# Patient Record
Sex: FEMALE | Race: BLACK OR AFRICAN AMERICAN | NOT HISPANIC OR LATINO | Employment: FULL TIME | RURAL
[De-identification: names, ages, dates, MRNs, and addresses within clinical notes are randomized per-mention and may not be internally consistent; named-entity substitution may affect disease eponyms.]

---

## 2020-08-01 ENCOUNTER — HISTORICAL (OUTPATIENT)
Dept: ADMINISTRATIVE | Facility: HOSPITAL | Age: 45
End: 2020-08-01

## 2020-08-01 LAB — SARS-COV+SARS-COV-2 AG RESP QL IA.RAPID: NEGATIVE

## 2022-02-22 ENCOUNTER — OFFICE VISIT (OUTPATIENT)
Dept: FAMILY MEDICINE | Facility: CLINIC | Age: 47
End: 2022-02-22
Payer: COMMERCIAL

## 2022-02-22 VITALS
TEMPERATURE: 99 F | SYSTOLIC BLOOD PRESSURE: 159 MMHG | OXYGEN SATURATION: 99 % | BODY MASS INDEX: 47.8 KG/M2 | WEIGHT: 280 LBS | DIASTOLIC BLOOD PRESSURE: 97 MMHG | HEART RATE: 102 BPM | HEIGHT: 64 IN

## 2022-02-22 DIAGNOSIS — R10.9 ABDOMINAL PAIN, UNSPECIFIED ABDOMINAL LOCATION: Primary | ICD-10-CM

## 2022-02-22 DIAGNOSIS — K59.00 CONSTIPATION, UNSPECIFIED CONSTIPATION TYPE: ICD-10-CM

## 2022-02-22 LAB
ALBUMIN SERPL BCP-MCNC: 3.3 G/DL (ref 3.5–5)
ALBUMIN/GLOB SERPL: 0.8 {RATIO}
ALP SERPL-CCNC: 93 U/L (ref 39–100)
ALT SERPL W P-5'-P-CCNC: 27 U/L (ref 13–56)
ANION GAP SERPL CALCULATED.3IONS-SCNC: 8 MMOL/L (ref 7–16)
AST SERPL W P-5'-P-CCNC: 22 U/L (ref 15–37)
BASOPHILS # BLD AUTO: 0.06 K/UL (ref 0–0.2)
BASOPHILS NFR BLD AUTO: 0.9 % (ref 0–1)
BILIRUB SERPL-MCNC: 0.4 MG/DL (ref 0–1.2)
BILIRUB SERPL-MCNC: NEGATIVE MG/DL
BLOOD, POC UA: ABNORMAL
BUN SERPL-MCNC: 7 MG/DL (ref 7–18)
BUN/CREAT SERPL: 10 (ref 6–20)
CALCIUM SERPL-MCNC: 8.6 MG/DL (ref 8.5–10.1)
CHLORIDE SERPL-SCNC: 105 MMOL/L (ref 98–107)
CO2 SERPL-SCNC: 27 MMOL/L (ref 21–32)
CREAT SERPL-MCNC: 0.68 MG/DL (ref 0.55–1.02)
DIFFERENTIAL METHOD BLD: ABNORMAL
EOSINOPHIL # BLD AUTO: 0.2 K/UL (ref 0–0.5)
EOSINOPHIL NFR BLD AUTO: 3.2 % (ref 1–4)
ERYTHROCYTE [DISTWIDTH] IN BLOOD BY AUTOMATED COUNT: 17.1 % (ref 11.5–14.5)
GLOBULIN SER-MCNC: 4.3 G/DL (ref 2–4)
GLUCOSE SERPL-MCNC: 105 MG/DL (ref 74–106)
GLUCOSE UR QL STRIP: NEGATIVE
HCT VFR BLD AUTO: 35 % (ref 38–47)
HGB BLD-MCNC: 11.5 G/DL (ref 12–16)
IMM GRANULOCYTES # BLD AUTO: 0.01 K/UL (ref 0–0.04)
IMM GRANULOCYTES NFR BLD: 0.2 % (ref 0–0.4)
KETONES UR QL STRIP: NEGATIVE
LEUKOCYTE ESTERASE URINE, POC: NEGATIVE
LYMPHOCYTES # BLD AUTO: 2.02 K/UL (ref 1–4.8)
LYMPHOCYTES NFR BLD AUTO: 32 % (ref 27–41)
MCH RBC QN AUTO: 28 PG (ref 27–31)
MCHC RBC AUTO-ENTMCNC: 32.9 G/DL (ref 32–36)
MCV RBC AUTO: 85.2 FL (ref 80–96)
MONOCYTES # BLD AUTO: 0.43 K/UL (ref 0–0.8)
MONOCYTES NFR BLD AUTO: 6.8 % (ref 2–6)
MPC BLD CALC-MCNC: 10.1 FL (ref 9.4–12.4)
NEUTROPHILS # BLD AUTO: 3.6 K/UL (ref 1.8–7.7)
NEUTROPHILS NFR BLD AUTO: 56.9 % (ref 53–65)
NITRITE, POC UA: NEGATIVE
NRBC # BLD AUTO: 0 X10E3/UL
NRBC, AUTO (.00): 0 %
PH, POC UA: 6.5
PLATELET # BLD AUTO: 334 K/UL (ref 150–400)
POTASSIUM SERPL-SCNC: 3.4 MMOL/L (ref 3.5–5.1)
PROT SERPL-MCNC: 7.6 G/DL (ref 6.4–8.2)
PROTEIN, POC: NEGATIVE
RBC # BLD AUTO: 4.11 M/UL (ref 4.2–5.4)
SODIUM SERPL-SCNC: 137 MMOL/L (ref 136–145)
SPECIFIC GRAVITY, POC UA: 1.02
UROBILINOGEN, POC UA: 0.2
WBC # BLD AUTO: 6.32 K/UL (ref 4.5–11)

## 2022-02-22 PROCEDURE — 85025 CBC WITH DIFFERENTIAL: ICD-10-PCS | Mod: ,,, | Performed by: CLINICAL MEDICAL LABORATORY

## 2022-02-22 PROCEDURE — 3080F DIAST BP >= 90 MM HG: CPT | Mod: ,,, | Performed by: NURSE PRACTITIONER

## 2022-02-22 PROCEDURE — 3077F SYST BP >= 140 MM HG: CPT | Mod: ,,, | Performed by: NURSE PRACTITIONER

## 2022-02-22 PROCEDURE — 99204 PR OFFICE/OUTPT VISIT, NEW, LEVL IV, 45-59 MIN: ICD-10-PCS | Mod: ,,, | Performed by: NURSE PRACTITIONER

## 2022-02-22 PROCEDURE — 85025 COMPLETE CBC W/AUTO DIFF WBC: CPT | Mod: ,,, | Performed by: CLINICAL MEDICAL LABORATORY

## 2022-02-22 PROCEDURE — 81003 POCT URINALYSIS: ICD-10-PCS | Mod: QW,,, | Performed by: NURSE PRACTITIONER

## 2022-02-22 PROCEDURE — 3080F PR MOST RECENT DIASTOLIC BLOOD PRESSURE >= 90 MM HG: ICD-10-PCS | Mod: ,,, | Performed by: NURSE PRACTITIONER

## 2022-02-22 PROCEDURE — 99204 OFFICE O/P NEW MOD 45 MIN: CPT | Mod: ,,, | Performed by: NURSE PRACTITIONER

## 2022-02-22 PROCEDURE — 1159F MED LIST DOCD IN RCRD: CPT | Mod: ,,, | Performed by: NURSE PRACTITIONER

## 2022-02-22 PROCEDURE — 1160F PR REVIEW ALL MEDS BY PRESCRIBER/CLIN PHARMACIST DOCUMENTED: ICD-10-PCS | Mod: ,,, | Performed by: NURSE PRACTITIONER

## 2022-02-22 PROCEDURE — 1160F RVW MEDS BY RX/DR IN RCRD: CPT | Mod: ,,, | Performed by: NURSE PRACTITIONER

## 2022-02-22 PROCEDURE — 3077F PR MOST RECENT SYSTOLIC BLOOD PRESSURE >= 140 MM HG: ICD-10-PCS | Mod: ,,, | Performed by: NURSE PRACTITIONER

## 2022-02-22 PROCEDURE — 3008F PR BODY MASS INDEX (BMI) DOCUMENTED: ICD-10-PCS | Mod: ,,, | Performed by: NURSE PRACTITIONER

## 2022-02-22 PROCEDURE — 81003 URINALYSIS AUTO W/O SCOPE: CPT | Mod: QW,,, | Performed by: NURSE PRACTITIONER

## 2022-02-22 PROCEDURE — 3008F BODY MASS INDEX DOCD: CPT | Mod: ,,, | Performed by: NURSE PRACTITIONER

## 2022-02-22 PROCEDURE — 80053 COMPREHEN METABOLIC PANEL: CPT | Mod: ,,, | Performed by: CLINICAL MEDICAL LABORATORY

## 2022-02-22 PROCEDURE — 1159F PR MEDICATION LIST DOCUMENTED IN MEDICAL RECORD: ICD-10-PCS | Mod: ,,, | Performed by: NURSE PRACTITIONER

## 2022-02-22 PROCEDURE — 80053 COMPREHENSIVE METABOLIC PANEL: ICD-10-PCS | Mod: ,,, | Performed by: CLINICAL MEDICAL LABORATORY

## 2022-02-22 RX ORDER — POLYETHYLENE GLYCOL 3350, SODIUM SULFATE ANHYDROUS, SODIUM BICARBONATE, SODIUM CHLORIDE, POTASSIUM CHLORIDE 236; 22.74; 6.74; 5.86; 2.97 G/4L; G/4L; G/4L; G/4L; G/4L
POWDER, FOR SOLUTION ORAL
Qty: 4000 ML | Refills: 0 | Status: SHIPPED | OUTPATIENT
Start: 2022-02-22 | End: 2022-03-23

## 2022-02-22 NOTE — PROGRESS NOTES
"Subjective:       Patient ID: Crys Lenz is a 46 y.o. female.    Chief Complaint: Abdominal Pain (Top & bottom abd. Pain, right side pain x 4 days )    Presents to clinic as above. Had a fever one day last week. None in several days. No dysuria, hematuria, or flank pain. Took a laxative yesterday and that helped some. Does not have a gallbladder. LMP 1/27/22 and normal. No hx of uterine fibroids. No blood in stool. No hx of diabetes.     Review of Systems   Constitutional: Negative for chills and malaise/fatigue.   HENT: Negative.    Respiratory: Negative.    Cardiovascular: Negative.    Gastrointestinal: Positive for abdominal pain and constipation. Negative for blood in stool, diarrhea, melena, nausea and vomiting.   Genitourinary: Negative.    Neurological: Negative.           Reviewed family, medical, surgical, and social history.    Objective:      BP (!) 159/97   Pulse 102   Temp 98.5 °F (36.9 °C)   Ht 5' 4" (1.626 m)   Wt 127 kg (280 lb)   SpO2 99%   BMI 48.06 kg/m²   Physical Exam  Vitals and nursing note reviewed.   Constitutional:       General: She is not in acute distress.     Appearance: She is obese. She is not ill-appearing, toxic-appearing or diaphoretic.   HENT:      Head: Normocephalic.      Right Ear: Tympanic membrane, ear canal and external ear normal.      Left Ear: Tympanic membrane, ear canal and external ear normal.      Mouth/Throat:      Mouth: Mucous membranes are moist.   Cardiovascular:      Rate and Rhythm: Normal rate and regular rhythm.      Pulses: Normal pulses.      Heart sounds: Normal heart sounds.   Pulmonary:      Effort: Pulmonary effort is normal.      Breath sounds: Normal breath sounds.   Abdominal:      General: There is no distension.      Palpations: Abdomen is soft. There is no mass.      Tenderness: There is abdominal tenderness. There is no right CVA tenderness, left CVA tenderness, guarding or rebound.      Hernia: No hernia is present.      Comments: " Generalized tenderness. Bowel sounds hypoactive.    Musculoskeletal:      Cervical back: Normal range of motion and neck supple.   Skin:     General: Skin is warm and dry.      Capillary Refill: Capillary refill takes less than 2 seconds.   Neurological:      General: No focal deficit present.      Mental Status: She is alert and oriented to person, place, and time.   Psychiatric:         Mood and Affect: Mood normal.         Behavior: Behavior normal.         Thought Content: Thought content normal.         Judgment: Judgment normal.            Office Visit on 02/22/2022   Component Date Value Ref Range Status    WBC, UA 02/22/2022 Negative   Final    Nitrite, UA 02/22/2022 Negative   Final    Urobilinogen, UA 02/22/2022 0.2   Final    Protein, POC 02/22/2022 Negative   Final    pH, UA 02/22/2022 6.5   Final    Blood, UA 02/22/2022 Trace   Final    Spec Grav UA 02/22/2022 1.025   Final    Ketones, UA 02/22/2022 Negative   Final    Bilirubin, POC 02/22/2022 Negative   Final    Glucose, UA 02/22/2022 Negative   Final    Sodium 02/22/2022 137  136 - 145 mmol/L Final    Potassium 02/22/2022 3.4 (A) 3.5 - 5.1 mmol/L Final    Chloride 02/22/2022 105  98 - 107 mmol/L Final    CO2 02/22/2022 27  21 - 32 mmol/L Final    Anion Gap 02/22/2022 8  7 - 16 mmol/L Final    Glucose 02/22/2022 105  74 - 106 mg/dL Final    BUN 02/22/2022 7  7 - 18 mg/dL Final    Creatinine 02/22/2022 0.68  0.55 - 1.02 mg/dL Final    BUN/Creatinine Ratio 02/22/2022 10  6 - 20 Final    Calcium 02/22/2022 8.6  8.5 - 10.1 mg/dL Final    Total Protein 02/22/2022 7.6  6.4 - 8.2 g/dL Final    Albumin 02/22/2022 3.3 (A) 3.5 - 5.0 g/dL Final    Globulin 02/22/2022 4.3 (A) 2.0 - 4.0 g/dL Final    A/G Ratio 02/22/2022 0.8   Final    Bilirubin, Total 02/22/2022 0.4  0.0 - 1.2 mg/dL Final    Alk Phos 02/22/2022 93  39 - 100 U/L Final    ALT 02/22/2022 27  13 - 56 U/L Final    AST 02/22/2022 22  15 - 37 U/L Final    eGFR 02/22/2022 99   >=60 mL/min/1.73m² Final    WBC 02/22/2022 6.32  4.50 - 11.00 K/uL Final    RBC 02/22/2022 4.11 (A) 4.20 - 5.40 M/uL Final    Hemoglobin 02/22/2022 11.5 (A) 12.0 - 16.0 g/dL Final    Hematocrit 02/22/2022 35.0 (A) 38.0 - 47.0 % Final    MCV 02/22/2022 85.2  80.0 - 96.0 fL Final    MCH 02/22/2022 28.0  27.0 - 31.0 pg Final    MCHC 02/22/2022 32.9  32.0 - 36.0 g/dL Final    RDW 02/22/2022 17.1 (A) 11.5 - 14.5 % Final    Platelet Count 02/22/2022 334  150 - 400 K/uL Final    MPV 02/22/2022 10.1  9.4 - 12.4 fL Final    Neutrophils % 02/22/2022 56.9  53.0 - 65.0 % Final    Lymphocytes % 02/22/2022 32.0  27.0 - 41.0 % Final    Monocytes % 02/22/2022 6.8 (A) 2.0 - 6.0 % Final    Eosinophils % 02/22/2022 3.2  1.0 - 4.0 % Final    Basophils % 02/22/2022 0.9  0.0 - 1.0 % Final    Immature Granulocytes % 02/22/2022 0.2  0.0 - 0.4 % Final    nRBC, Auto 02/22/2022 0.0  <=0.0 % Final    Neutrophils, Abs 02/22/2022 3.60  1.80 - 7.70 K/uL Final    Lymphocytes, Absolute 02/22/2022 2.02  1.00 - 4.80 K/uL Final    Monocytes, Absolute 02/22/2022 0.43  0.00 - 0.80 K/uL Final    Eosinophils, Absolute 02/22/2022 0.20  0.00 - 0.50 K/uL Final    Basophils, Absolute 02/22/2022 0.06  0.00 - 0.20 K/uL Final    Immature Granulocytes, Absolute 02/22/2022 0.01  0.00 - 0.04 K/uL Final    nRBC, Absolute 02/22/2022 0.00  <=0.00 x10e3/uL Final    Diff Type 02/22/2022 Auto   Final      Assessment:       1. Abdominal pain, unspecified abdominal location    2. Constipation, unspecified constipation type        Plan:       Abdominal pain, unspecified abdominal location  -     CBC Auto Differential; Future; Expected date: 02/22/2022  -     POCT Urinalysis  -     X-Ray Abdomen AP 1 View; Future; Expected date: 02/22/2022  -     Comprehensive Metabolic Panel; Future; Expected date: 02/22/2022  -     polyethylene glycol (GOLYTELY,NULYTELY) 236-22.74-6.74 -5.86 gram suspension; Drink 8 ounces q 1 hour until begins to pass stool.   Dispense: 4000 mL; Refill: 0  -     CT Abdomen Pelvis With Contrast; Future; Expected date: 02/22/2022    Constipation, unspecified constipation type  -     polyethylene glycol (GOLYTELY,NULYTELY) 236-22.74-6.74 -5.86 gram suspension; Drink 8 ounces q 1 hour until begins to pass stool.  Dispense: 4000 mL; Refill: 0    on 3/3/22 patient notified that insurance declined CT. I am referring to GI. If has not heard from us within 1 week, call and F/U with referral clerk. GO to ER with severe pain or fever. Voiced agreement. LGant, FNP.      Discussed CBC, UA, and CMP results which were normal. Referral for CT of abd and pelvis with oral contrast as outpatient. Gave option of going to ER now for scan. States if does not improve or worsens, will go to ER.  I also encouraged to take her temp q 6 hours and if runs fever, go to ER.       Risks, benefits, and side effects were discussed with the patient. All questions were answered to the fullest satisfaction of the patient, and pt verbalized understanding and agreement to treatment plan. Pt was to call with any new or worsening symptoms, or present to the ER.

## 2022-03-23 ENCOUNTER — OFFICE VISIT (OUTPATIENT)
Dept: GASTROENTEROLOGY | Facility: CLINIC | Age: 47
End: 2022-03-23
Payer: COMMERCIAL

## 2022-03-23 VITALS
DIASTOLIC BLOOD PRESSURE: 90 MMHG | BODY MASS INDEX: 46.1 KG/M2 | OXYGEN SATURATION: 98 % | RESPIRATION RATE: 14 BRPM | HEIGHT: 64 IN | HEART RATE: 81 BPM | SYSTOLIC BLOOD PRESSURE: 148 MMHG | WEIGHT: 270 LBS

## 2022-03-23 DIAGNOSIS — Z12.11 ENCOUNTER FOR SCREENING COLONOSCOPY: ICD-10-CM

## 2022-03-23 DIAGNOSIS — R10.9 ABDOMINAL PAIN, UNSPECIFIED ABDOMINAL LOCATION: ICD-10-CM

## 2022-03-23 DIAGNOSIS — R10.10 UPPER ABDOMINAL PAIN: Primary | ICD-10-CM

## 2022-03-23 PROCEDURE — 3008F BODY MASS INDEX DOCD: CPT | Mod: ,,, | Performed by: NURSE PRACTITIONER

## 2022-03-23 PROCEDURE — 99204 OFFICE O/P NEW MOD 45 MIN: CPT | Mod: ,,, | Performed by: NURSE PRACTITIONER

## 2022-03-23 PROCEDURE — 3077F PR MOST RECENT SYSTOLIC BLOOD PRESSURE >= 140 MM HG: ICD-10-PCS | Mod: ,,, | Performed by: NURSE PRACTITIONER

## 2022-03-23 PROCEDURE — 3008F PR BODY MASS INDEX (BMI) DOCUMENTED: ICD-10-PCS | Mod: ,,, | Performed by: NURSE PRACTITIONER

## 2022-03-23 PROCEDURE — 99204 PR OFFICE/OUTPT VISIT, NEW, LEVL IV, 45-59 MIN: ICD-10-PCS | Mod: ,,, | Performed by: NURSE PRACTITIONER

## 2022-03-23 PROCEDURE — 3077F SYST BP >= 140 MM HG: CPT | Mod: ,,, | Performed by: NURSE PRACTITIONER

## 2022-03-23 PROCEDURE — 3080F DIAST BP >= 90 MM HG: CPT | Mod: ,,, | Performed by: NURSE PRACTITIONER

## 2022-03-23 PROCEDURE — 3080F PR MOST RECENT DIASTOLIC BLOOD PRESSURE >= 90 MM HG: ICD-10-PCS | Mod: ,,, | Performed by: NURSE PRACTITIONER

## 2022-03-23 RX ORDER — PANTOPRAZOLE SODIUM 40 MG/1
40 TABLET, DELAYED RELEASE ORAL DAILY
Qty: 90 TABLET | Refills: 3 | Status: SHIPPED | OUTPATIENT
Start: 2022-03-23 | End: 2022-06-21

## 2022-03-23 NOTE — PROGRESS NOTES
"Pt is a 47 y/o BF here for upper abd pain that feels like "twisting" x5 weeks. S/p cholecystectomy 2000. Seen by Loraine LOWE on 2/22/22 and was given Rx for Nulytely to help with constipation. She states she drank the prep and is not constipated now but still has the upper abd pain.  Pt has a pending contrasted CT AP.  Has never had a colonoscopy - no fam hx of CRC.    No past medical history on file.  PSxH: sebas & TL    Review of Systems   Constitutional: Negative for chills and fever.   Respiratory: Negative for shortness of breath.    Cardiovascular: Negative for chest pain.   Gastrointestinal: Positive for abdominal pain. Negative for blood in stool, constipation, diarrhea, melena, nausea and vomiting.   Skin: Negative for rash.   Neurological: Negative for weakness.     Physical Exam  Vitals reviewed. Exam conducted with a chaperone present.   Constitutional:       General: She is not in acute distress.     Appearance: Normal appearance.   HENT:      Head: Normocephalic.   Eyes:      General: No scleral icterus.     Pupils: Pupils are equal, round, and reactive to light.   Cardiovascular:      Rate and Rhythm: Normal rate.   Pulmonary:      Effort: Pulmonary effort is normal.   Abdominal:      General: There is no distension.      Palpations: Abdomen is soft.      Tenderness: There is no abdominal tenderness. There is no guarding or rebound.   Skin:     General: Skin is warm and dry.   Neurological:      General: No focal deficit present.      Mental Status: She is alert and oriented to person, place, and time. Mental status is at baseline.   Psychiatric:         Mood and Affect: Mood normal.         Behavior: Behavior normal.         Thought Content: Thought content normal.         Judgment: Judgment normal.       Plan  -abd u/s for abd pain - insurance rejected CT  -EGD  -Protonix 40 mg daily  -consider cardiology referral at f/u if no better  -RTC 6 weeks, sooner PRN  "

## 2022-03-31 ENCOUNTER — HOSPITAL ENCOUNTER (OUTPATIENT)
Dept: RADIOLOGY | Facility: HOSPITAL | Age: 47
Discharge: HOME | End: 2022-03-31
Attending: NURSE PRACTITIONER
Payer: COMMERCIAL

## 2022-03-31 DIAGNOSIS — R10.10 UPPER ABDOMINAL PAIN: ICD-10-CM

## 2022-03-31 PROCEDURE — 76700 US EXAM ABDOM COMPLETE: CPT | Mod: 26,,, | Performed by: RADIOLOGY

## 2022-03-31 PROCEDURE — 76700 US EXAM ABDOM COMPLETE: CPT | Mod: TC

## 2022-03-31 PROCEDURE — 76700 US ABDOMEN COMPLETE: ICD-10-PCS | Mod: 26,,, | Performed by: RADIOLOGY

## 2024-01-03 ENCOUNTER — OFFICE VISIT (OUTPATIENT)
Dept: FAMILY MEDICINE | Facility: CLINIC | Age: 49
End: 2024-01-03
Payer: COMMERCIAL

## 2024-01-03 VITALS
WEIGHT: 290 LBS | HEIGHT: 64 IN | OXYGEN SATURATION: 96 % | DIASTOLIC BLOOD PRESSURE: 80 MMHG | TEMPERATURE: 98 F | SYSTOLIC BLOOD PRESSURE: 124 MMHG | RESPIRATION RATE: 18 BRPM | BODY MASS INDEX: 49.51 KG/M2 | HEART RATE: 93 BPM

## 2024-01-03 DIAGNOSIS — R05.1 ACUTE COUGH: ICD-10-CM

## 2024-01-03 DIAGNOSIS — J32.9 SINUSITIS, UNSPECIFIED CHRONICITY, UNSPECIFIED LOCATION: Primary | ICD-10-CM

## 2024-01-03 LAB
CTP QC/QA: YES
CTP QC/QA: YES
FLUAV AG NPH QL: NEGATIVE
FLUBV AG NPH QL: NEGATIVE
SARS-COV-2 RDRP RESP QL NAA+PROBE: NEGATIVE

## 2024-01-03 PROCEDURE — 1160F RVW MEDS BY RX/DR IN RCRD: CPT | Mod: ,,, | Performed by: FAMILY MEDICINE

## 2024-01-03 PROCEDURE — 87635 SARS-COV-2 COVID-19 AMP PRB: CPT | Mod: ,,, | Performed by: FAMILY MEDICINE

## 2024-01-03 PROCEDURE — 87804 INFLUENZA ASSAY W/OPTIC: CPT | Mod: QW,,, | Performed by: FAMILY MEDICINE

## 2024-01-03 PROCEDURE — 3079F DIAST BP 80-89 MM HG: CPT | Mod: ,,, | Performed by: FAMILY MEDICINE

## 2024-01-03 PROCEDURE — 1159F MED LIST DOCD IN RCRD: CPT | Mod: ,,, | Performed by: FAMILY MEDICINE

## 2024-01-03 PROCEDURE — 99214 OFFICE O/P EST MOD 30 MIN: CPT | Mod: 25,,, | Performed by: FAMILY MEDICINE

## 2024-01-03 PROCEDURE — 96372 THER/PROPH/DIAG INJ SC/IM: CPT | Mod: ,,, | Performed by: FAMILY MEDICINE

## 2024-01-03 PROCEDURE — 3074F SYST BP LT 130 MM HG: CPT | Mod: ,,, | Performed by: FAMILY MEDICINE

## 2024-01-03 PROCEDURE — 3008F BODY MASS INDEX DOCD: CPT | Mod: ,,, | Performed by: FAMILY MEDICINE

## 2024-01-03 PROCEDURE — 3046F HEMOGLOBIN A1C LEVEL >9.0%: CPT | Mod: ,,, | Performed by: FAMILY MEDICINE

## 2024-01-03 RX ORDER — DEXAMETHASONE SODIUM PHOSPHATE 4 MG/ML
4 INJECTION, SOLUTION INTRA-ARTICULAR; INTRALESIONAL; INTRAMUSCULAR; INTRAVENOUS; SOFT TISSUE
Status: COMPLETED | OUTPATIENT
Start: 2024-01-03 | End: 2024-01-03

## 2024-01-03 RX ORDER — PREDNISONE 20 MG/1
20 TABLET ORAL DAILY
Qty: 5 TABLET | Refills: 0 | Status: SHIPPED | OUTPATIENT
Start: 2024-01-03 | End: 2024-01-08

## 2024-01-03 RX ORDER — AMOXICILLIN AND CLAVULANATE POTASSIUM 875; 125 MG/1; MG/1
1 TABLET, FILM COATED ORAL 2 TIMES DAILY
Qty: 14 TABLET | Refills: 0 | Status: SHIPPED | OUTPATIENT
Start: 2024-01-03 | End: 2024-01-10

## 2024-01-03 RX ADMIN — DEXAMETHASONE SODIUM PHOSPHATE 4 MG: 4 INJECTION, SOLUTION INTRA-ARTICULAR; INTRALESIONAL; INTRAMUSCULAR; INTRAVENOUS; SOFT TISSUE at 03:01

## 2024-01-03 NOTE — PROGRESS NOTES
Subjective:       Patient ID: Crys Lenz is a 48 y.o. female.    Chief Complaint: Cough (X 2 weeks. ), Blurred Vision (X 2 days), Chest Congestion (X 1 week), Shortness of Breath (X 1 week. ), and Wheezing    Cough  Associated symptoms include postnasal drip, rhinorrhea, shortness of breath and wheezing. Pertinent negatives include no chest pain, chills, ear pain, fever, headaches, myalgias, rash or sore throat. There is no history of environmental allergies.   Shortness of Breath  Associated symptoms include rhinorrhea and wheezing. Pertinent negatives include no abdominal pain, chest pain, ear pain, fever, headaches, leg pain, leg swelling, neck pain, rash, sore throat or vomiting.   Wheezing   Associated symptoms include coughing, rhinorrhea and shortness of breath. Pertinent negatives include no abdominal pain, chest pain, chills, diarrhea, ear pain, fever, headaches, neck pain, rash, sore throat or vomiting.     Review of Systems   Constitutional:  Negative for activity change, appetite change, chills, diaphoresis, fatigue, fever and unexpected weight change.   HENT:  Positive for nasal congestion, postnasal drip, rhinorrhea and sinus pressure/congestion. Negative for dental problem, drooling, ear discharge, ear pain, facial swelling, hearing loss, mouth sores, nosebleeds, sneezing, sore throat, tinnitus, trouble swallowing, voice change and goiter.    Eyes:  Negative for photophobia, discharge, itching and visual disturbance.   Respiratory:  Positive for cough, shortness of breath and wheezing. Negative for apnea, choking, chest tightness and stridor.    Cardiovascular:  Negative for chest pain, palpitations, leg swelling and claudication.   Gastrointestinal:  Negative for abdominal distention, abdominal pain, anal bleeding, blood in stool, change in bowel habit, constipation, diarrhea, nausea and vomiting.   Endocrine: Negative for cold intolerance, heat intolerance, polydipsia, polyphagia and polyuria.    Genitourinary:  Negative for bladder incontinence, decreased urine volume, difficulty urinating, dysuria, enuresis, flank pain, frequency, hematuria, nocturia, pelvic pain and urgency.   Musculoskeletal:  Negative for arthralgias, back pain, gait problem, joint swelling, leg pain, myalgias, neck pain, neck stiffness and joint deformity.   Integumentary:  Negative for pallor, rash, wound, breast mass and breast tenderness.   Allergic/Immunologic: Negative for environmental allergies, food allergies and immunocompromised state.   Neurological:  Negative for dizziness, vertigo, tremors, seizures, syncope, facial asymmetry, speech difficulty, weakness, light-headedness, numbness, headaches, memory loss and coordination difficulties.   Hematological:  Negative for adenopathy. Does not bruise/bleed easily.   Psychiatric/Behavioral:  Negative for agitation, behavioral problems, confusion, decreased concentration, dysphoric mood, hallucinations, self-injury, sleep disturbance and suicidal ideas. The patient is not nervous/anxious and is not hyperactive.    Breast: Negative for mass and tenderness        Objective:      Physical Exam  Vitals reviewed.   Constitutional:       Appearance: Normal appearance.   HENT:      Head: Normocephalic and atraumatic.      Right Ear: Tympanic membrane, ear canal and external ear normal.      Left Ear: Tympanic membrane, ear canal and external ear normal.      Nose: Congestion and rhinorrhea present.      Mouth/Throat:      Mouth: Mucous membranes are moist.      Pharynx: Oropharynx is clear. Posterior oropharyngeal erythema present.   Eyes:      Extraocular Movements: Extraocular movements intact.      Conjunctiva/sclera: Conjunctivae normal.      Pupils: Pupils are equal, round, and reactive to light.   Cardiovascular:      Rate and Rhythm: Normal rate and regular rhythm.      Pulses: Normal pulses.      Heart sounds: Normal heart sounds.   Pulmonary:      Effort: Pulmonary effort is  normal.      Breath sounds: Normal breath sounds.   Abdominal:      General: Bowel sounds are normal.      Palpations: Abdomen is soft.   Musculoskeletal:         General: Normal range of motion.      Cervical back: Normal range of motion and neck supple.   Skin:     General: Skin is warm and dry.   Neurological:      General: No focal deficit present.      Mental Status: She is alert. Mental status is at baseline.   Psychiatric:         Mood and Affect: Mood normal.         Behavior: Behavior normal.         Thought Content: Thought content normal.         Judgment: Judgment normal.         Assessment:       1. Sinusitis, unspecified chronicity, unspecified location    2. Acute cough        Plan:     Sinusitis, unspecified chronicity, unspecified location  -     dexAMETHasone injection 4 mg  -     amoxicillin-clavulanate 875-125mg (AUGMENTIN) 875-125 mg per tablet; Take 1 tablet by mouth 2 (two) times a day. for 7 days  Dispense: 14 tablet; Refill: 0  -     predniSONE (DELTASONE) 20 MG tablet; Take 1 tablet (20 mg total) by mouth once daily. for 5 days  Dispense: 5 tablet; Refill: 0    Acute cough  -     POCT Influenza A/B  -     POCT COVID-19 Rapid Screening

## 2024-01-05 ENCOUNTER — HOSPITAL ENCOUNTER (EMERGENCY)
Facility: HOSPITAL | Age: 49
Discharge: HOME | End: 2024-01-06
Attending: EMERGENCY MEDICINE
Payer: COMMERCIAL

## 2024-01-05 DIAGNOSIS — E11.9 NEW ONSET TYPE 2 DIABETES MELLITUS: Primary | ICD-10-CM

## 2024-01-05 DIAGNOSIS — R73.9 HYPERGLYCEMIA: ICD-10-CM

## 2024-01-05 DIAGNOSIS — E66.01 MORBID OBESITY: ICD-10-CM

## 2024-01-05 LAB
ACETONE SERPL QL SCN: NEGATIVE
ALBUMIN SERPL BCP-MCNC: 4.2 G/DL (ref 3.5–5)
ALBUMIN/GLOB SERPL: 0.8 {RATIO}
ALP SERPL-CCNC: 170 U/L (ref 39–100)
ALT SERPL W P-5'-P-CCNC: 54 U/L (ref 13–56)
ANION GAP SERPL CALCULATED.3IONS-SCNC: 15 MMOL/L (ref 7–16)
ANION GAP SERPL CALCULATED.3IONS-SCNC: 19 MMOL/L (ref 7–16)
AST SERPL W P-5'-P-CCNC: 21 U/L (ref 15–37)
BASOPHILS # BLD AUTO: 0.04 K/UL (ref 0–0.2)
BASOPHILS NFR BLD AUTO: 0.5 % (ref 0–1)
BILIRUB SERPL-MCNC: 0.9 MG/DL (ref ?–1.2)
BILIRUB UR QL STRIP: NEGATIVE
BUN SERPL-MCNC: 20 MG/DL (ref 7–18)
BUN SERPL-MCNC: 25 MG/DL (ref 7–18)
BUN/CREAT SERPL: 18 (ref 6–20)
BUN/CREAT SERPL: 20 (ref 6–20)
CALCIUM SERPL-MCNC: 10.2 MG/DL (ref 8.5–10.1)
CALCIUM SERPL-MCNC: 9.3 MG/DL (ref 8.5–10.1)
CHLORIDE SERPL-SCNC: 100 MMOL/L (ref 98–107)
CHLORIDE SERPL-SCNC: 90 MMOL/L (ref 98–107)
CLARITY UR: CLEAR
CO2 SERPL-SCNC: 27 MMOL/L (ref 21–32)
CO2 SERPL-SCNC: 27 MMOL/L (ref 21–32)
COLOR UR: COLORLESS
CREAT SERPL-MCNC: 1 MG/DL (ref 0.55–1.02)
CREAT SERPL-MCNC: 1.36 MG/DL (ref 0.55–1.02)
DIFFERENTIAL METHOD BLD: ABNORMAL
EGFR (NO RACE VARIABLE) (RUSH/TITUS): 48 ML/MIN/1.73M2
EGFR (NO RACE VARIABLE) (RUSH/TITUS): 70 ML/MIN/1.73M2
EOSINOPHIL # BLD AUTO: 0.01 K/UL (ref 0–0.5)
EOSINOPHIL NFR BLD AUTO: 0.1 % (ref 1–4)
ERYTHROCYTE [DISTWIDTH] IN BLOOD BY AUTOMATED COUNT: 12.5 % (ref 11.5–14.5)
EST. AVERAGE GLUCOSE BLD GHB EST-MCNC: 260 MG/DL
GLOBULIN SER-MCNC: 5 G/DL (ref 2–4)
GLUCOSE SERPL-MCNC: 351 MG/DL (ref 70–105)
GLUCOSE SERPL-MCNC: 406 MG/DL (ref 70–105)
GLUCOSE SERPL-MCNC: 444 MG/DL (ref 70–105)
GLUCOSE SERPL-MCNC: 449 MG/DL (ref 70–105)
GLUCOSE SERPL-MCNC: 455 MG/DL (ref 70–105)
GLUCOSE SERPL-MCNC: 498 MG/DL (ref 74–106)
GLUCOSE SERPL-MCNC: 791 MG/DL (ref 74–106)
GLUCOSE UR STRIP-MCNC: >1000 MG/DL
HBA1C MFR BLD HPLC: 10.7 % (ref 4.5–6.6)
HCT VFR BLD AUTO: 45.2 % (ref 38–47)
HGB BLD-MCNC: 15.7 G/DL (ref 12–16)
IMM GRANULOCYTES # BLD AUTO: 0.03 K/UL (ref 0–0.04)
IMM GRANULOCYTES NFR BLD: 0.3 % (ref 0–0.4)
KETONES UR STRIP-SCNC: 20 MG/DL
LEUKOCYTE ESTERASE UR QL STRIP: NEGATIVE
LYMPHOCYTES # BLD AUTO: 1.23 K/UL (ref 1–4.8)
LYMPHOCYTES NFR BLD AUTO: 14.1 % (ref 27–41)
MCH RBC QN AUTO: 31.1 PG (ref 27–31)
MCHC RBC AUTO-ENTMCNC: 34.7 G/DL (ref 32–36)
MCV RBC AUTO: 89.5 FL (ref 80–96)
MONOCYTES # BLD AUTO: 0.35 K/UL (ref 0–0.8)
MONOCYTES NFR BLD AUTO: 4 % (ref 2–6)
MPC BLD CALC-MCNC: 11.5 FL (ref 9.4–12.4)
NEUTROPHILS # BLD AUTO: 7.09 K/UL (ref 1.8–7.7)
NEUTROPHILS NFR BLD AUTO: 81 % (ref 53–65)
NITRITE UR QL STRIP: NEGATIVE
NRBC # BLD AUTO: 0 X10E3/UL
NRBC, AUTO (.00): 0 %
PH UR STRIP: 5 PH UNITS
PLATELET # BLD AUTO: 378 K/UL (ref 150–400)
POTASSIUM SERPL-SCNC: 4.7 MMOL/L (ref 3.5–5.1)
POTASSIUM SERPL-SCNC: 4.8 MMOL/L (ref 3.5–5.1)
PROT SERPL-MCNC: 9.2 G/DL (ref 6.4–8.2)
PROT UR QL STRIP: NEGATIVE
RBC # BLD AUTO: 5.05 M/UL (ref 4.2–5.4)
RBC # UR STRIP: NEGATIVE /UL
SODIUM SERPL-SCNC: 131 MMOL/L (ref 136–145)
SODIUM SERPL-SCNC: 137 MMOL/L (ref 136–145)
SP GR UR STRIP: 1.03
UROBILINOGEN UR STRIP-ACNC: NORMAL MG/DL
WBC # BLD AUTO: 8.75 K/UL (ref 4.5–11)

## 2024-01-05 PROCEDURE — 25000003 PHARM REV CODE 250: Performed by: NURSE PRACTITIONER

## 2024-01-05 PROCEDURE — 80048 BASIC METABOLIC PNL TOTAL CA: CPT | Mod: XB | Performed by: FAMILY MEDICINE

## 2024-01-05 PROCEDURE — 85025 COMPLETE CBC W/AUTO DIFF WBC: CPT | Performed by: NURSE PRACTITIONER

## 2024-01-05 PROCEDURE — 99284 EMERGENCY DEPT VISIT MOD MDM: CPT | Mod: 25

## 2024-01-05 PROCEDURE — 99284 EMERGENCY DEPT VISIT MOD MDM: CPT | Mod: ,,, | Performed by: EMERGENCY MEDICINE

## 2024-01-05 PROCEDURE — 83036 HEMOGLOBIN GLYCOSYLATED A1C: CPT | Performed by: NURSE PRACTITIONER

## 2024-01-05 PROCEDURE — 63600175 PHARM REV CODE 636 W HCPCS: Performed by: NURSE PRACTITIONER

## 2024-01-05 PROCEDURE — 82962 GLUCOSE BLOOD TEST: CPT

## 2024-01-05 PROCEDURE — 81003 URINALYSIS AUTO W/O SCOPE: CPT | Performed by: NURSE PRACTITIONER

## 2024-01-05 PROCEDURE — 96361 HYDRATE IV INFUSION ADD-ON: CPT

## 2024-01-05 PROCEDURE — 80053 COMPREHEN METABOLIC PANEL: CPT | Performed by: NURSE PRACTITIONER

## 2024-01-05 PROCEDURE — 96376 TX/PRO/DX INJ SAME DRUG ADON: CPT

## 2024-01-05 PROCEDURE — 96374 THER/PROPH/DIAG INJ IV PUSH: CPT

## 2024-01-05 PROCEDURE — 63600175 PHARM REV CODE 636 W HCPCS: Performed by: EMERGENCY MEDICINE

## 2024-01-05 PROCEDURE — 82009 KETONE BODYS QUAL: CPT | Performed by: NURSE PRACTITIONER

## 2024-01-05 RX ORDER — SODIUM CHLORIDE 9 MG/ML
1000 INJECTION, SOLUTION INTRAVENOUS
Status: COMPLETED | OUTPATIENT
Start: 2024-01-05 | End: 2024-01-05

## 2024-01-05 RX ADMIN — HUMAN INSULIN 6 UNITS: 100 INJECTION, SOLUTION SUBCUTANEOUS at 07:01

## 2024-01-05 RX ADMIN — SODIUM CHLORIDE 1000 ML: 9 INJECTION, SOLUTION INTRAVENOUS at 02:01

## 2024-01-05 RX ADMIN — HUMAN INSULIN 6 UNITS: 100 INJECTION, SOLUTION SUBCUTANEOUS at 03:01

## 2024-01-05 RX ADMIN — SODIUM CHLORIDE 1000 ML: 9 INJECTION, SOLUTION INTRAVENOUS at 12:01

## 2024-01-05 RX ADMIN — HUMAN INSULIN 10 UNITS: 100 INJECTION, SOLUTION SUBCUTANEOUS at 02:01

## 2024-01-05 RX ADMIN — SODIUM CHLORIDE 1000 ML: 9 INJECTION, SOLUTION INTRAVENOUS at 03:01

## 2024-01-05 NOTE — ED PROVIDER NOTES
Encounter Date: 1/5/2024       History     Chief Complaint   Patient presents with    Hyperglycemia     Patient sent from clinic for blood sugar > 500    Fatigue    Polyuria    Polydipsia     Pt presents with elevated glucose at [facility] today with fatigue, polyuria, polydipsia x several days. Pt reports no know history of diabetes nor other health concerns in the past.       Review of patient's allergies indicates:  No Known Allergies  History reviewed. No pertinent past medical history.  Past Surgical History:   Procedure Laterality Date    GALLBLADDER SURGERY  2000    TUBAL LIGATION  2000     History reviewed. No pertinent family history.  Social History     Tobacco Use    Smoking status: Former     Types: Cigarettes    Smokeless tobacco: Never   Substance Use Topics    Alcohol use: Never    Drug use: Never     Review of Systems   Constitutional:  Positive for fatigue. Negative for fever.   HENT:  Negative for sore throat.    Respiratory:  Negative for shortness of breath.    Cardiovascular:  Negative for chest pain.   Gastrointestinal:  Negative for nausea.   Endocrine: Positive for polydipsia and polyuria.   Genitourinary:  Negative for dysuria.   Musculoskeletal:  Negative for back pain.   Skin:  Negative for rash.   Neurological:  Negative for weakness.   Hematological:  Does not bruise/bleed easily.   Psychiatric/Behavioral:  Negative for behavioral problems.        Physical Exam     Initial Vitals [01/05/24 1141]   BP Pulse Resp Temp SpO2   (!) 163/98 94 20 97.7 °F (36.5 °C) 96 %      MAP       --         Physical Exam    Nursing note and vitals reviewed.  Constitutional: She appears well-developed.   Eyes: Conjunctivae are normal. Pupils are equal, round, and reactive to light.   Cardiovascular:  Normal rate and regular rhythm.           Pulmonary/Chest: Breath sounds normal.   Musculoskeletal:         General: Normal range of motion.     Neurological: She is alert and oriented to person, place, and time.    Psychiatric: She has a normal mood and affect. Thought content normal.         Medical Screening Exam   See Full Note    ED Course   Procedures  Labs Reviewed   COMPREHENSIVE METABOLIC PANEL - Abnormal; Notable for the following components:       Result Value    Sodium 131 (*)     Chloride 90 (*)     Anion Gap 19 (*)     Glucose 791 (*)     BUN 25 (*)     Creatinine 1.36 (*)     Calcium 10.2 (*)     Total Protein 9.2 (*)     Globulin 5.0 (*)     Alk Phos 170 (*)     eGFR 48 (*)     All other components within normal limits   HEMOGLOBIN A1C - Abnormal; Notable for the following components:    Hemoglobin A1C 10.7 (*)     All other components within normal limits   URINALYSIS, REFLEX TO URINE CULTURE - Abnormal; Notable for the following components:    Glucose, UA >1000 (*)     Ketones, UA 20 (*)     Specific Gravity, UA 1.034 (*)     All other components within normal limits   CBC WITH DIFFERENTIAL - Abnormal; Notable for the following components:    MCH 31.1 (*)     Neutrophils % 81.0 (*)     Lymphocytes % 14.1 (*)     Eosinophils % 0.1 (*)     All other components within normal limits   BASIC METABOLIC PANEL - Abnormal; Notable for the following components:    Glucose 498 (*)     BUN 20 (*)     All other components within normal limits   POCT GLUCOSE MONITORING CONTINUOUS - Abnormal; Notable for the following components:    POC Glucose 455 (*)     All other components within normal limits   POCT GLUCOSE MONITORING CONTINUOUS - Abnormal; Notable for the following components:    POC Glucose 449 (*)     All other components within normal limits   POCT GLUCOSE MONITORING CONTINUOUS - Abnormal; Notable for the following components:    POC Glucose 406 (*)     All other components within normal limits   POCT GLUCOSE MONITORING CONTINUOUS - Abnormal; Notable for the following components:    POC Glucose 444 (*)     All other components within normal limits   POCT GLUCOSE MONITORING CONTINUOUS - Abnormal; Notable for the  following components:    POC Glucose 351 (*)     All other components within normal limits   CBC W/ AUTO DIFFERENTIAL    Narrative:     The following orders were created for panel order CBC auto differential.  Procedure                               Abnormality         Status                     ---------                               -----------         ------                     CBC with Differential[[id number]]       Abnormal            Final result                 Please view results for these tests on the individual orders.   ACETONE   POCT GLUCOSE MONITORING CONTINUOUS   POCT GLUCOSE MONITORING CONTINUOUS   POCT GLUCOSE MONITORING CONTINUOUS          Imaging Results    None          Medications   0.9%  NaCl infusion (0 mLs Intravenous Stopped 1/5/24 1432)   insulin regular injection 10 Units 0.1 mL (10 Units Intravenous Given 1/5/24 1449)   0.9%  NaCl infusion (0 mLs Intravenous Stopped 1/5/24 1530)   insulin regular injection 6 Units 0.06 mL (6 Units Intravenous Given 1/5/24 1551)   0.9%  NaCl infusion (0 mLs Intravenous Stopped 1/5/24 1646)   insulin regular injection 6 Units 0.06 mL (6 Units Intravenous Given 1/5/24 1939)     Medical Decision Making  Pt presents with elevated glucose at [facility] today with fatigue, polyuria, polydipsia x several days. Pt reports no know history of diabetes nor other health concerns in the past.     MDM    Patient presents for emergent evaluation of acute hyperglycemia that poses a threat to life and/or bodily function.    In the ED patient found to have acute hypoglycemia new onset diabetes.    I ordered labs and personally reviewed them.  Labs significant for hemoglobin A1c 10.7.  Blood glucose eventually improved less than 400 patient asymptomatic.  Nontoxic appearing.  No anion gap.  We proBNP with normal bicarb no ketones in urine repeat  Starting on metformin and glipizide.  Giving glucose monitor.  All questions answered.  Will follow up with diabetic clinic voiced  understanding to return the ER if symptoms worsen or new symptoms develop.  Condition stabilized    Discharge MDM  I discussed the patient presentation and findings with the consultant for hospital medicine (speciality).    Patient was managed in the ED with IV normal saline IV insulin.    The response to treatment was improved.    Patient was discharged in stable condition.  Detailed return precautions discussed.     Amount and/or Complexity of Data Reviewed  Labs: ordered.    Risk  OTC drugs.  Prescription drug management.               ED Course as of 01/06/24 0036   Fri Jan 05, 2024   1901 Discussed with nurse practitioner had discussed with the daytime hospitalist will discuss with nocturnist new onset diabetic with hyperglycemia.  Originally ketones in urine negative serum acetone.  Anion gap was 14. [PK]   1957 Patient has been having some polyuria and polydipsia for few months.  Around Christmas patient had flu-like illness with coughing congestion and malaise.  That lasted for few days and then she got better.  O over the past few days she has had some coughing and generalized weakness she was seen at her doctor's office and given a steroid shot a few days ago.  She presents today with decreased p.o. intake and polyuria polydipsia had use a glucose monitor from her aunt and was too high to read.  Patient nontoxic appearing she is feeling better after treatment in the ED. Was given 3 L of fluid.  Serum ketones are negative.  Repeat BNP no anion gap.  Bicarb is 27.  Worse recent glucose is 498.  Will give more insulin.  Discussed with the patient about her care and she is feeling better glucose is better probably will send her home tonight with follow-up with primary care/diabetic clinic.  She was okay with this plan [PK]      ED Course User Index  [PK] Richi Santa MD                           Clinical Impression:   Final diagnoses:  [E11.9] New onset type 2 diabetes mellitus (Primary)  [E66.01]  Morbid obesity  [R73.9] Hyperglycemia        ED Disposition Condition    Discharge Stable          ED Prescriptions       Medication Sig Dispense Start Date End Date Auth. Provider    metFORMIN (GLUCOPHAGE) 500 MG tablet Take 1 tablet (500 mg total) by mouth 2 (two) times a day. 30 tablet 1/6/2024 1/5/2025 Richi Santa MD    glipiZIDE (GLUCOTROL) 5 MG tablet Take 1 tablet (5 mg total) by mouth 2 (two) times daily before meals. 60 tablet 1/6/2024 1/5/2025 Richi Santa MD    blood-glucose meter (ACCU-CHEK AYSHA PLUS METER) Misc 1 Units by Misc.(Non-Drug; Combo Route) route 4 (four) times daily before meals and nightly. 1 each 1/6/2024 1/5/2025 Richi Santa MD    lancets (LANCETS,ULTRA THIN) Misc 1 Lancet by Misc.(Non-Drug; Combo Route) route as needed. 200 each 1/6/2024 -- Richi Santa MD    blood sugar diagnostic Strp 1 strip by Misc.(Non-Drug; Combo Route) route 4 (four) times daily before meals and nightly. 100 strip 1/6/2024 -- Richi Santa MD          Follow-up Information       Follow up With Specialties Details Why Contact Info    Haley Rock, FNP Emergency Medicine, Family Medicine Schedule an appointment as soon as possible for a visit   [address] [place] [address] Emergency [facility]  [facility] [id number]  [phone]      [facility] - Emergency Department Emergency Medicine Go to  As needed, If symptoms worsen [address] [place] [id number]  [phone]             Richi Santa MD  01/06/24 0036

## 2024-01-06 VITALS
WEIGHT: 270 LBS | SYSTOLIC BLOOD PRESSURE: 147 MMHG | HEART RATE: 78 BPM | HEIGHT: 64 IN | TEMPERATURE: 98 F | DIASTOLIC BLOOD PRESSURE: 81 MMHG | RESPIRATION RATE: 14 BRPM | OXYGEN SATURATION: 97 % | BODY MASS INDEX: 46.1 KG/M2

## 2024-01-06 RX ORDER — DEXTROSE 4 G
1 TABLET,CHEWABLE ORAL
Qty: 1 EACH | Refills: 0 | Status: SHIPPED | OUTPATIENT
Start: 2024-01-06 | End: 2025-01-05

## 2024-01-06 RX ORDER — GLIPIZIDE 5 MG/1
5 TABLET ORAL
Qty: 60 TABLET | Refills: 0 | Status: SHIPPED | OUTPATIENT
Start: 2024-01-06 | End: 2025-01-05

## 2024-01-06 RX ORDER — LANCETS
1 EACH MISCELLANEOUS
Qty: 200 EACH | Refills: 1 | Status: SHIPPED | OUTPATIENT
Start: 2024-01-06

## 2024-01-06 RX ORDER — METFORMIN HYDROCHLORIDE 500 MG/1
500 TABLET ORAL 2 TIMES DAILY
Qty: 30 TABLET | Refills: 0 | Status: SHIPPED | OUTPATIENT
Start: 2024-01-06 | End: 2025-01-05

## 2024-01-06 NOTE — DISCHARGE INSTRUCTIONS
Get started on your diabetes medications in the morning.    Follow-up with the diabetic clinic.  Additionally can follow up with the primary care doctor to help with your overall health needs    Keep a blood sugar journal    Return the ER if symptoms are worsening or new symptoms develop

## 2024-01-08 LAB
GLUCOSE SERPL-MCNC: 487 MG/DL (ref 70–105)
GLUCOSE SERPL-MCNC: 564 MG/DL (ref 70–105)
GLUCOSE SERPL-MCNC: >600 MG/DL (ref 70–105)
GLUCOSE SERPL-MCNC: >600 MG/DL (ref 70–105)

## 2024-01-11 DIAGNOSIS — E11.65 CONTROLLED TYPE 2 DIABETES MELLITUS WITH HYPERGLYCEMIA, WITHOUT LONG-TERM CURRENT USE OF INSULIN: Primary | ICD-10-CM

## 2024-01-22 ENCOUNTER — PATIENT MESSAGE (OUTPATIENT)
Dept: DIABETES | Facility: CLINIC | Age: 49
End: 2024-01-22
Payer: COMMERCIAL

## 2024-01-23 ENCOUNTER — CLINICAL SUPPORT (OUTPATIENT)
Dept: DIABETES | Facility: CLINIC | Age: 49
End: 2024-01-23
Payer: COMMERCIAL

## 2024-01-23 VITALS — WEIGHT: 278.63 LBS | BODY MASS INDEX: 47.57 KG/M2 | HEIGHT: 64 IN

## 2024-01-23 DIAGNOSIS — R73.9 HYPERGLYCEMIA: ICD-10-CM

## 2024-01-23 DIAGNOSIS — E11.9 TYPE 2 DIABETES MELLITUS WITHOUT COMPLICATION, WITHOUT LONG-TERM CURRENT USE OF INSULIN: Primary | ICD-10-CM

## 2024-01-23 PROCEDURE — 99213 OFFICE O/P EST LOW 20 MIN: CPT | Mod: PBBFAC

## 2024-01-23 PROCEDURE — 99999PBSHW PR PBB SHADOW TECHNICAL ONLY FILED TO HB: Mod: PBBFAC,,,

## 2024-01-23 PROCEDURE — G0108 DIAB MANAGE TRN  PER INDIV: HCPCS | Mod: PBBFAC | Performed by: EMERGENCY MEDICINE

## 2024-01-23 NOTE — LETTER
January 23, 2024      Richi Santa MD  [address] [facility] [employer]  [place] [address]         Patient: Crys Lenz   MR Number: [id number]   YOB: 1975   Date of Visit: 1/23/2024     Dear Dr. Santa:    Thank you for referring Crys for diabetes self-management education and support services. She was seen today for an initial visit. Below is a summary of goals she set to self-manage her diabetes better.  My complete note is in Epic.    Patient Outcomes:    A1c Status:   Lab Results   Component Value Date    HGBA1C 11.2 (H) 01/08/2024    HGBA1C 10.7 (H) 01/05/2024     Care Plan: Diabetes Management     Problem: Healthy Eating      Goal: Eat 3 meals daily with 30-45g (2-3) servings of Carbohydrate per meal. (For weight loss)    Start Date: 1/23/2024   Expected End Date: 2/20/2024   Barriers: No Barriers Identified   Note:    Reviewed the sources and role of Carbohydrate, Protein, and Fat and how each nutrient impact blood sugar. Provided meal-planning instruction via food lists, plate method, food models, food labels. Reviewed micro/macronutrient effect on health management. Discussed carbohydrate counting and spacing. Reviewed principles of energy metabolism to assist weight and health management. Discussed strategies for healthier dining out and replacing sugary beverages with water and other noncaloric, sugar free options.   A handout on weight loss and recommendations for healthier food choices. Several handouts on Planning a healthy meal, Building a Balanced Meal, Heart-Healthy Consistent Carbohydrate Nutrition Therapy.      Task: Review the importance of balancing carbohydrates with each meal using portion control techniques to count servings of carbohydrate and label reading to identify serving size and amount of total carbs per serving. Completed 1/23/2024     Task: Provided Sample plate method and reviewed the use of the plate to  estimate amounts of carbohydrate per meal. Completed 1/23/2024     Problem: Blood Glucose Self-Monitoring      Goal: Patient agrees to check and record blood sugars randomly fasting and 2 hours pp 2  times or more  per day and log. Has a FSL she plans to wear continuously or can check with glucose meter if not wearing the CGM.    Start Date: 1/23/2024   Expected End Date: 2/20/2024   Barriers: No Barriers Identified   Note:    Discussed checking randomly  AC and 2 hours PP to see how she does with food and medication and logging so she can look for trends and patterns.  Reviewed benefits, techniques of self-monitoring blood glucose. Discussed appropriate timing and frequency to SMBG, education on parameters on when to notify provider and advised patient to bring logs to all appts with PCP/Endocrinologist/Diabetes Care Specialist.  Handouts provided on Factors affecting Blood sugar, Checking your Blood Sugar, All About Blood Glucose.     Task: Reviewed the importance of self-monitoring blood glucose and keeping logs. Completed 1/23/2024     Problem: Physical Activity and Exercise      Goal: Patient agrees to increase physical activity to a goal of 5 times per week for 30 minutes.    Start Date: 1/23/2024   Expected End Date: 2/20/2024   Barriers: No Barriers Identified   Note:    Discussed importance of daily physical activity with review of benefits, methods, guidelines, and precautions.     Task: Discussed role of physical activity on reducing insulin resistance and improvement in overall glycemic control. Completed 1/23/2024        Task: Discussed role of physical activity as it relates to weight loss Completed 1/23/2024        Task: Offered suggestions on how patient could increase their regular physical activity Completed 1/23/2024        Task: Reviewed blood glucose monitoring before, during and after exercise/activity Completed 1/23/2024          Follow up:   Crys to attend medical appointments as  scheduled  Crys to update you on her DM education progress as needed      If you have questions, please do not hesitate to call me. I look forward to providing additional education and support as needed.    Sincerely,    Kirsten Gracia RN  Diabetes Care and

## 2024-01-23 NOTE — PROGRESS NOTES
"Diabetes Care Specialist Progress Note  Author: CANDACE FUENTES RN  Date: 1/23/2024    Program Intake  Reason for Diabetes Program Visit:: Initial Diabetes Assessment (New diagnosis)  Current diabetes risk level::  (deferred)  In the last 12 months, have you been to the ER or admitted to the hospital?:  (ER recently due to flu and blood sugar elevated )  Permission to speak with others about care:: no    Lab Results   Component Value Date    HGBA1C 11.2 (H) 01/08/2024     Referred by Dr. Richi Santa in the ER for Diabetes Education.    No PCP in the past but since diabetes diagnosis has started seeing CHRISSY Tony at [facility].     CURRENT DIABETES MEDICATIONS:   Ozempic  Inject .25 mg into the skin every 7 days. Started by Erinn LOWE  Metformin (GLUCOPHAGE) 500 MG tablet: Take 1 tablet (500 mg total) by mouth 2 (two) times daily with meals.   Glipizide 5 mg tablet  Take 1 (5 mg) tablet twice a day (2 x  daily) with meals    ACE or ARB:  Lisinopril Take 1 tablet (10 mg total) by mouth once daily. (Started by Erinn LOWE)     Statin:   None    Clinical    Weight: 126.4 kg (278 lb 9.6 oz)   Height: 5' 4" (162.6 cm)   Body mass index is 47.82 kg/m².    Patient Health Rating  Compared to other people your age, how would you rate your health?: Fair    Problem Review  Reviewed Problem List with Patient: yes  Active comorbidities affecting diabetes self-care.: no  Reviewed health maintenance: yes    Clinical Assessment  Current Diabetes Treatment: Oral Medication, Injectable  Have you ever experienced hypoglycemia (low blood sugar)?: no  Have you ever experienced hyperglycemia (high blood sugar)?: yes  Are you able to tell when your blood sugar is high?: Yes  What are your symptoms?: fatigue, frequent urination, thirst, blurry vision  Have you ever been hospitalized because your blood sugar was high?: no    Medication Information  How do you obtain your medications?: " Patient drives  How many days a week do you miss your medications?: Never  Do you use a pill box or medication chart to help you manage your medications?: No (uses pill bottles)  Do you sometimes have difficulty refilling your medications?: No  Medication adherence impacting ability to self-manage diabetes?: No    Labs  Lab Compliance Barriers: No    Nutritional Status  Meal Plan 24 Hour Recall: Breakfast, Lunch, Dinner  Meal Plan 24 Hour Recall - Breakfast: avocado, turkey savage and water  Meal Plan 24 Hour Recall - Lunch: grilled chicken salad from [employer], water (eats out once a day)  Meal Plan 24 Hour Recall - Dinner: bake chicken, broccoli, corn on the cob, water  Change in appetite?: Yes (Started on Ozempic and feels full and not eating as much)  Dentation:: Intact  Current nutritional status an area of need that is impacting patient's ability to self-manage diabetes?: Yes    Additional Social History    Support  Does anyone support you with your diabetes care?: yes  Who supports you?: son/daughter, parent, significant other  Who takes you to your medical appointments?: self  Does the current support meet the patient's needs?: Yes  Is Support an area impacting ability to self-manage diabetes?: No    Access to Mass Media & Technology  Media or technology needs impacting ability to self-manage diabetes?: No    Cognitive/Behavioral Health  Alert and Oriented: Yes  Difficulty Thinking: No  Requires Prompting: No  Requires assistance for routine expression?: No  Cognitive or behavioral barriers impacting ability to self-manage diabetes?: No    Culture/Synagogue  Culture or Latter-day beliefs that may impact ability to access healthcare: No    Communication  Language preference: English  Hearing Problems: No  Vision Problems: No (2 years ago had a clot in L eye but it resolved on last visit a year ago.)  Communication needs impacting ability to self-manage diabetes?: No    Health Literacy  Preferred Learning  Method: Face to Face, Reading Materials  How often do you need to have someone help you read instructions, pamphlets, or written material from your doctor or pharmacy?: Never      Diabetes Self-Management Skills Assessment    Diabetes Disease Process/Treatment Options  Patient/caregiver able to state what happens when someone has diabetes.: no  Patient/caregiver knows what type of diabetes they have.: yes  Diabetes Type : Type II  Patient/caregiver able to identify at least three signs and symptoms of diabetes.: yes  Identified signs and symptoms:: blurred vision, fatigue, increased thirst  Patient able to identify at least three risk factors for diabetes.: no  Diabetes Disease Process/Treatment Options: Skills Assessment Completed: Yes  Assessment indicates:: Knowledge deficit, Instruction Needed  Area of need?: Yes    Nutrition/Healthy Eating  Method of carbohydrate measurement:: no knowledge of what carbs are  Patient can identify foods that impact blood sugar.: no (see comments)  Nutrition/Healthy Eating Skills Assessment Completed:: Yes  Assessment indicates:: Knowledge deficit, Instruction Needed  Area of need?: Yes    Physical Activity/Exercise  Patient's daily activity level:: constantly moving (Constantly moving at work)  Patient formally exercises outside of work.: no  Patient can identify reasons why exercise/physical activity is important in diabetes management.: no  Physical Activity/Exercise Skills Assessment Completed: : Yes  Assessment indicates:: Knowledge deficit, Instruction Needed  Area of need?: Yes    Medications  Patient is able to describe current diabetes management routine.: yes  Diabetes management routine:: injectable medications, oral medications  Patient is able to identify current diabetes medications, dosages, and appropriate timing of medications.: yes  Patient understands the purpose of the medications taken for diabetes.: no  Patient reports problems or concerns with current  medication regimen.: no  Medication Skills Assessment Completed:: Yes  Assessment indicates:: Knowledge deficit, Instruction Needed  Area of need?: Yes    Home Blood Glucose Monitoring  Patient states that blood sugar is checked at home daily.: yes  Monitoring Method:: personal continuous glucose monitor, home glucometer  Home glucometer meter type:: FSL2 (wearing it continuously)  and ACCU CHEK AYSHA PLUS)  Blood glucose logs:: encouraged to keep logs, encouraged to bring logs to provider visits  Personal CGM type:: FSL2  Home Blood Glucose Monitoring Skills Assessment Completed: : Yes  Assessment indicates:: Knowledge deficit, Instruction Needed  Area of need?: Yes    Acute Complications  Area of need?: Deferred    Chronic Complications  Area of need?: Deferred    Psychosocial/Coping  Patient can identify ways of coping with chronic disease.: yes  Patient-stated ways of coping with chronic disease:: counseling/actively seeing behavioral professional, support from loved ones (Lost her son 8 months ago.  Went to Counseling has support from family.)  Psychosocial/Coping Skills Assessment Completed: : Yes  Assessment indicates:: Adequate understanding  Area of need?: No (A handout on Diabetes Distress provided.)      Assessment Summary and Plan    Based on today's diabetes care assessment, the following areas of need were identified:          1/23/2024       Social   Support No   Access to Mass Media/Tech No   Cognitive/Behavioral Health No   Culture/Anabaptist No   Communication No            1/23/2024       Clinical   Medication Adherence No   Lab Compliance No   Nutritional Status Yes            1/23/2024       Diabetes Self-Management Skills   Diabetes Disease Process/Treatment Options Yes - Reviewed diabetes pathophysiology, different types of diabetes, signs and symptoms, risk factors, progression, and treatment guidelines. Emphasized on the importance of controlling diabetes to prevent complications and how  diabetes can be successfully managed. Handout Provided on, Understanding Type 2 Diabetes, How to Find Out if You Have Diabetes and how to manage diabetes.     Nutrition/Healthy Eating Yes - See Care Plan     Physical Activity/Exercise Yes - See Care Plan     Medication Yes - Provided review of current diabetes medication action, dosage, frequency, side effects, and storage guidelines. Discussed guidelines for preventing, detecting, and treating hypoglycemia and hyperglycemia. Reviewed the importance of meal and medication timing with diabetes mediations for prevention of acute complications and maximum drug benefit.  (Handout provided on oral medications (Glipizide, Metformin) and role of GLP1.      Home Blood Glucose Monitoring Yes - See Care Plan     Acute Complications Deferred - Time     Chronic Complications Deferred - Time     Psychosocial/Coping No - Has good support from family and significant other.           Today's interventions were provided through individual discussion, instruction, and written materials were provided.      Patient verbalized understanding of instruction and written materials.  Pt was able to return back demonstration of instructions today. Patient understood key points, needs reinforcement and further instruction.     Diabetes Self-Management Care Plan:    Today's Diabetes Self-Management Care Plan was developed with Crys's input. Crsy has agreed to work toward the following goal(s) to improve his/her overall diabetes control.      Care Plan: Diabetes Management        Problem: Healthy Eating         Goal: Eat 3 meals daily with 30-45g (2-3) servings of Carbohydrate per meal. (For weight loss)    Start Date: 1/23/2024   Expected End Date: 2/20/2024   Barriers: No Barriers Identified   Note:    Reviewed the sources and role of Carbohydrate, Protein, and Fat and how each nutrient impact blood sugar. Provided meal-planning instruction via food lists, plate method, food models,  food labels. Reviewed micro/macronutrient effect on health management. Discussed carbohydrate counting and spacing. Reviewed principles of energy metabolism to assist weight and health management. Discussed strategies for healthier dining out and replacing sugary beverages with water and other noncaloric, sugar free options.   A handout on weight loss and recommendations for healthier food choices. Several handouts on Planning a healthy meal, Building a Balanced Meal, Heart-Healthy Consistent Carbohydrate Nutrition Therapy.      Task: Review the importance of balancing carbohydrates with each meal using portion control techniques to count servings of carbohydrate and label reading to identify serving size and amount of total carbs per serving. Completed 1/23/2024        Task: Provided Sample plate method and reviewed the use of the plate to estimate amounts of carbohydrate per meal. Completed 1/23/2024        Problem: Blood Glucose Self-Monitoring         Goal: Patient agrees to check and record blood sugars randomly fasting and 2 hours pp 2  times or more  per day and log. Has a FSL she plans to wear it continuously or can check with glucose meter if not wearing the CGM.      Start Date: 1/23/2024   Expected End Date: 2/20/2024   Barriers: No Barriers Identified   Note:    Discussed checking randomly  AC and 2 hours PP to see how she does with food and medication and logging so she can look for trends and patterns.  Reviewed benefits, techniques of self-monitoring blood glucose. Discussed appropriate timing and frequency to SMBG, education on parameters on when to notify provider and advised patient to bring logs to all appts with PCP/Endocrinologist/Diabetes Care Specialist.  Handouts provided on Factors affecting Blood sugar, Checking your Blood Sugar, All About Blood Glucose.     Task: Reviewed the importance of self-monitoring blood glucose and keeping logs. Completed 1/23/2024          Problem: Physical  Activity and Exercise         Goal: Patient agrees to increase physical activity to a goal of 5 times per week for 30 minutes.    Start Date: 1/23/2024   Expected End Date: 2/20/2024   Barriers: No Barriers Identified   Note:    Discussed importance of daily physical activity with review of benefits, methods, guidelines, and precautions.     Task: Discussed role of physical activity on reducing insulin resistance and improvement in overall glycemic control. Completed 1/23/2024        Task: Discussed role of physical activity as it relates to weight loss Completed 1/23/2024        Task: Offered suggestions on how patient could increase their regular physical activity Completed 1/23/2024        Task: Reviewed blood glucose monitoring before, during and after exercise/activity Completed 1/23/2024          Follow Up Plan     Follow up in about 4 weeks (around 2/20/2024).    Today's care plan and follow up schedule was discussed with patient.  Crys verbalized understanding of the care plan, goals, and agrees to follow up plan.        The patient was encouraged to communicate with his/her health care provider/physician and care team regarding his/her condition(s) and treatment.  I provided the patient with my contact information today and encouraged to contact me via phone or [facility]'s Patient Portal as needed.     Length of Visit   Total Time: 90 Minutes

## 2025-03-14 ENCOUNTER — OFFICE VISIT (OUTPATIENT)
Dept: FAMILY MEDICINE | Facility: CLINIC | Age: 50
End: 2025-03-14
Payer: COMMERCIAL

## 2025-03-14 VITALS
WEIGHT: 285 LBS | RESPIRATION RATE: 18 BRPM | SYSTOLIC BLOOD PRESSURE: 132 MMHG | DIASTOLIC BLOOD PRESSURE: 92 MMHG | HEART RATE: 74 BPM | OXYGEN SATURATION: 96 % | TEMPERATURE: 98 F | BODY MASS INDEX: 48.92 KG/M2

## 2025-03-14 DIAGNOSIS — M79.622 PAIN OF LEFT UPPER ARM: ICD-10-CM

## 2025-03-14 DIAGNOSIS — L03.114 CELLULITIS OF LEFT UPPER ARM: Primary | ICD-10-CM

## 2025-03-14 DIAGNOSIS — R23.8 REDNESS AND SWELLING OF UPPER ARM: ICD-10-CM

## 2025-03-14 DIAGNOSIS — M79.89 REDNESS AND SWELLING OF UPPER ARM: ICD-10-CM

## 2025-03-14 RX ORDER — DEXAMETHASONE SODIUM PHOSPHATE 4 MG/ML
4 INJECTION, SOLUTION INTRA-ARTICULAR; INTRALESIONAL; INTRAMUSCULAR; INTRAVENOUS; SOFT TISSUE
Status: COMPLETED | OUTPATIENT
Start: 2025-03-14 | End: 2025-03-14

## 2025-03-14 RX ORDER — CEFTRIAXONE 1 G/1
1 INJECTION, POWDER, FOR SOLUTION INTRAMUSCULAR; INTRAVENOUS
Status: COMPLETED | OUTPATIENT
Start: 2025-03-14 | End: 2025-03-14

## 2025-03-14 RX ORDER — LOSARTAN POTASSIUM 25 MG/1
25 TABLET ORAL DAILY
COMMUNITY

## 2025-03-14 RX ORDER — METHYLPREDNISOLONE ACETATE 40 MG/ML
40 INJECTION, SUSPENSION INTRA-ARTICULAR; INTRALESIONAL; INTRAMUSCULAR; SOFT TISSUE
Status: COMPLETED | OUTPATIENT
Start: 2025-03-14 | End: 2025-03-14

## 2025-03-14 RX ORDER — HYDROCORTISONE 1 %
CREAM (GRAM) TOPICAL 2 TIMES DAILY
Qty: 60 G | Refills: 0 | Status: SHIPPED | OUTPATIENT
Start: 2025-03-14

## 2025-03-14 RX ORDER — CLINDAMYCIN HYDROCHLORIDE 300 MG/1
300 CAPSULE ORAL EVERY 8 HOURS
Qty: 30 CAPSULE | Refills: 0 | Status: SHIPPED | OUTPATIENT
Start: 2025-03-14

## 2025-03-14 RX ORDER — SEMAGLUTIDE 1.34 MG/ML
0.5 INJECTION, SOLUTION SUBCUTANEOUS
COMMUNITY

## 2025-03-14 RX ADMIN — CEFTRIAXONE 1 G: 1 INJECTION, POWDER, FOR SOLUTION INTRAMUSCULAR; INTRAVENOUS at 03:03

## 2025-03-14 RX ADMIN — DEXAMETHASONE SODIUM PHOSPHATE 4 MG: 4 INJECTION, SOLUTION INTRA-ARTICULAR; INTRALESIONAL; INTRAMUSCULAR; INTRAVENOUS; SOFT TISSUE at 03:03

## 2025-03-14 RX ADMIN — METHYLPREDNISOLONE ACETATE 40 MG: 40 INJECTION, SUSPENSION INTRA-ARTICULAR; INTRALESIONAL; INTRAMUSCULAR; SOFT TISSUE at 03:03

## 2025-03-14 NOTE — PROGRESS NOTES
Gabriela Chaudhary DNP   [address] [place] [address]     PATIENT NAME: Crys Lenz  : 1975  DATE: 3/14/25  MRN: [id number]      Billing Provider: Gabriela Chaudhary DNP  Level of Service:   Patient PCP Information       Provider PCP Type    Primary Doctor No General            Reason for Visit / Chief Complaint: Tachycardia and Rash (L inner arm @ bicep area redden and with swelling.)       Update PCP  Update Chief Complaint         History of Present Illness / Problem Focused Workflow     Crys Lenz presents to the clinic with Tachycardia and Rash (L inner arm @ bicep area redden and with swelling.)     Rash        Review of Systems     Review of Systems   Integumentary:  Positive for rash.        Medical / Social / Family History   No past medical history on file.    Past Surgical History:   Procedure Laterality Date    GALLBLADDER SURGERY      TUBAL LIGATION         Social History  Ms.  reports that she has quit smoking. Her smoking use included cigarettes. She has never used smokeless tobacco. She reports that she does not drink alcohol and does not use drugs.    Family History  Ms.'s family history is not on file.    Medications and Allergies     Medications  No outpatient medications have been marked as taking for the 3/14/25 encounter (Office Visit) with Gabriela Chaudhary DNP.     Current Facility-Administered Medications for the 3/14/25 encounter (Office Visit) with Gabriela Chaudhary DNP   Medication Dose Route Frequency Provider Last Rate Last Admin    cefTRIAXone injection 1 g  1 g Intramuscular 1 time in Clinic/HOD Gabriela Chaudhary DNP        dexAMETHasone injection 4 mg  4 mg Intramuscular 1 time in Clinic/HOD Gabriela Chaudhary DNP        methylPREDNISolone acetate injection 40 mg  40 mg Intramuscular 1 time in Clinic/HOD Gabriela Chaudhary DNP           Allergies  Review of patient's allergies indicates:  No Known Allergies    Physical Examination   BP  (!) 132/92 (BP Location: Right arm, Patient Position: Sitting)   Pulse 74   Temp 98.2 °F (36.8 °C) (Oral)   Resp 18   Wt 129.3 kg (285 lb)   SpO2 96%   BMI 48.92 kg/m²    Physical Exam  Vitals and nursing note reviewed.   Constitutional:       Appearance: Normal appearance. She is obese. She is ill-appearing.   HENT:      Head: Normocephalic.      Nose: Nose normal.      Mouth/Throat:      Mouth: Mucous membranes are moist.   Eyes:      Extraocular Movements: Extraocular movements intact.      Conjunctiva/sclera: Conjunctivae normal.      Pupils: Pupils are equal, round, and reactive to light.   Cardiovascular:      Rate and Rhythm: Normal rate and regular rhythm.      Pulses: Normal pulses.      Heart sounds: Normal heart sounds.   Pulmonary:      Effort: Pulmonary effort is normal.      Breath sounds: Normal breath sounds.   Abdominal:      General: Abdomen is flat. Bowel sounds are normal.      Palpations: Abdomen is soft.   Musculoskeletal:         General: Swelling and tenderness present.      Cervical back: Normal range of motion.      Comments: Large erythematous warmth and swelling noted to left upper arm and posterior arm. Starting to spread down mid upper arm. No bite or scab noted,    Skin:     General: Skin is warm and dry.      Capillary Refill: Capillary refill takes less than 2 seconds.   Neurological:      General: No focal deficit present.      Mental Status: She is alert and oriented to person, place, and time. Mental status is at baseline.   Psychiatric:         Mood and Affect: Mood normal.         Behavior: Behavior normal.         Thought Content: Thought content normal.         Judgment: Judgment normal.          Assessment and Plan (including Health Maintenance)      Problem List  Smart Sets  Document Outside HM   :    Plan:     Continue benadryl ointment with cortisone oint   Antihistamines for itching  Avoid scratching  If not better rtc.    Health Maintenance Due   Topic Date Due     Hepatitis C Screening  Never done    Cervical Cancer Screening  Never done    Lipid Panel  Never done    Diabetes Urine Screening  Never done    Foot Exam  Never done    Diabetic Eye Exam  Never done    HIV Screening  Never done    TETANUS VACCINE  Never done    Mammogram  Never done    Pneumococcal Vaccines (Age 0-49) (1 of 2 - PCV) Never done    Low Dose Statin  Never done    Colorectal Cancer Screening  Never done    Influenza Vaccine (1) Never done    COVID-19 Vaccine (1 - 2024-25 season) Never done       Problem List Items Addressed This Visit          ID    Cellulitis of left upper arm - Primary       Orthopedic    Pain of left upper arm    Redness and swelling of upper arm       Health Maintenance Topics with due status: Not Due       Topic Last Completion Date    Hemoglobin A1c 02/13/2025    RSV Vaccine (Age 60+ and Pregnant patients) Not Due       No future appointments.         Signature:  Gabriela Chaudhary, SVEN      [address] [place] [address]    Date of encounter: 3/14/25

## 2025-07-15 ENCOUNTER — PATIENT MESSAGE (OUTPATIENT)
Dept: FAMILY MEDICINE | Facility: CLINIC | Age: 50
End: 2025-07-15
Payer: COMMERCIAL